# Patient Record
Sex: FEMALE | Race: OTHER | Employment: FULL TIME | URBAN - METROPOLITAN AREA
[De-identification: names, ages, dates, MRNs, and addresses within clinical notes are randomized per-mention and may not be internally consistent; named-entity substitution may affect disease eponyms.]

---

## 2018-10-19 ENCOUNTER — HOSPITAL ENCOUNTER (EMERGENCY)
Age: 28
Discharge: HOME | End: 2018-10-19
Attending: EMERGENCY MEDICINE
Payer: COMMERCIAL

## 2018-10-19 VITALS
SYSTOLIC BLOOD PRESSURE: 132 MMHG | HEIGHT: 61 IN | RESPIRATION RATE: 18 BRPM | OXYGEN SATURATION: 99 % | BODY MASS INDEX: 33.42 KG/M2 | TEMPERATURE: 98.2 F | DIASTOLIC BLOOD PRESSURE: 82 MMHG | WEIGHT: 177 LBS | HEART RATE: 90 BPM

## 2018-10-19 DIAGNOSIS — L05.01 PILONIDAL ABSCESS: Primary | ICD-10-CM

## 2018-10-19 PROCEDURE — 75810000289 HC I&D ABSCESS SIMP/COMP/MULT

## 2018-10-19 PROCEDURE — 74011000250 HC RX REV CODE- 250: Performed by: PHYSICIAN ASSISTANT

## 2018-10-19 PROCEDURE — 99282 EMERGENCY DEPT VISIT SF MDM: CPT

## 2018-10-19 RX ORDER — LIDOCAINE HYDROCHLORIDE AND EPINEPHRINE 20; 10 MG/ML; UG/ML
10 INJECTION, SOLUTION INFILTRATION; PERINEURAL
Status: COMPLETED | OUTPATIENT
Start: 2018-10-19 | End: 2018-10-19

## 2018-10-19 RX ADMIN — LIDOCAINE HYDROCHLORIDE,EPINEPHRINE BITARTRATE 200 MG: 20; .01 INJECTION, SOLUTION INFILTRATION; PERINEURAL at 23:08

## 2018-10-20 NOTE — DISCHARGE INSTRUCTIONS
Absceso pilonidal: Instrucciones de cuidado - [ Pilonidal Abscess: Care Instructions ]  Instrucciones de cuidado    Un absceso pilonidal es margarette infección provocada por un pelo encarnado. Shauna absceso se presenta en la raina del cóccix (base de la columna) y en la parte superior de las nalgas. La infección hace que se forme un saco de pus. Eso puede ser bastante doloroso. Es posible que el médico haya abierto y Amilcar Reji. Puede cuidarse en foley hogar para ayudar a sanar la raina. En algunos casos, el absceso vuelve a aparecer. Si es así, el médico podría sugerirle que se opere para eliminar el sitio de la infección. Es posible que le hayan dado un sedante para ayudarle a relajarse. Usted podría estar algo tambaleante después de la sedación. Pueden pasar algunas horas hasta que los efectos del medicamento desaparezcan. Los efectos secundarios comunes de la sedación incluyen náuseas, vómitos y sensación de somnolencia o cansancio. El médico lo gordon examinado minuciosamente, do pueden presentarse problemas más tarde. Si nota algún problema o nuevos síntomas, busque tratamiento médico de inmediato. La atención de seguimiento es margarette parte clave de foley tratamiento y seguridad. Asegúrese de hacer y acudir a todas las citas, y llame a foley médico si está teniendo problemas. También es margarette buena idea saber los resultados de nerissa exámenes y mantener margarette lista de los medicamentos que wilder. ¿Cómo puede cuidarse en el hogar? · Si el médico le tre un sedante:  ? Diaz 24 horas, no frank nada que requiera prestar atención a detalles. Hace falta tiempo hasta que los efectos del medicamento desaparezcan por completo. ? Para foley seguridad, no conduzca ni opere maquinaria que pudiera ser Benna Pack. Espere hasta que los efectos del medicamento desaparezcan y usted pueda pensar con claridad y reaccionar con facilidad. · Si foley médico le recetó antibióticos, tómelos exactamente según las indicaciones.  No deje de tomarlos solo porque se sienta mejor. Debe romina todos los antibióticos hasta terminarlos. · Sea nora con los medicamentos. Rienzi los analgésicos (medicamentos para el dolor) exactamente según lo indicado. ? Si el [employer] tre un analgésico recetado, tómelo según las indicaciones. ? Si no está tomando un analgésico recetado, pregúntele a foley médico si puede romina andrea de [employer]. · Si el médico abrió y drenó el absceso, usted podría tener gasa u otro material dentro de la herida. Siga todas las instrucciones del médico sobre cómo cuidar la herida. · Mantenga la raina de la herida muy limpia. Use bolitas de algodón húmedo, un paño tibio o toallitas para bebé. Limpie la raina suavemente, especialmente después de evacuar el intestino. ¿Cuándo debe pedir ayuda? Llame al 911 en cualquier momento que considere que necesita atención de [place]. Por ejemplo, llame si:    · Tiene dificultad para respirar.     · Se desmayó (perdió el conocimiento).    Llame a foley médico ahora mismo o busque atención médica inmediata si:    · Tiene náuseas o vómito nuevos o peores.     · Tiene síntomas de infección, tales ivet:  ? Aumento del dolor, la hinchazón, la temperatura o el enrojecimiento. ? Vetas rojizas que salen de la raina. ? Pus que sale de la raina. ? Arnulfo Girt especial atención a los cambios en foley naman y asegúrese de comunicarse con foley médico si:    · No mejora ivet se esperaba. ¿Dónde puede encontrar más información en inglés? Everton Sang a http://gary-ivonne.info/. Rosy Chu T041 en la búsqueda para aprender más acerca de \"Absceso pilonidal: Instrucciones de cuidado - [ Pilonidal Abscess: Care Instructions ]. \"  Revisado: 18 abrahan, 2018  Versión del contenido: 11.8  © 4478-4390 [employer], [employer]. Las instrucciones de cuidado fueron adaptadas bajo licencia por [employer] (which disclaims liability or warranty for this information).  Si usted tiene preguntas sobre margarette afección Siomara o sobre estas instrucciones, siempre pregunte a foley profesional de naman. [facility] niega toda garantía o responsabilidad por foley uso de esta información.

## 2018-10-20 NOTE — ED TRIAGE NOTES
Triage: Patient arrives ambulatory from home with c/o pain and abscess to buttocks.  Reports hx of same requiring I & D.

## 2018-10-20 NOTE — ED PROVIDER NOTES
This is a 32 yo  female with medical hx remarkable for pilonidal abscess presenting with complaint of a 4 day hx of developing abscess to the gluteal cleft, progressively worsening with associated pain, exacerbated with sitting, walking and standing. Seen at [facility] yesterday and prescribed Tylenol #3, bactrim and augmentin. Has taken one day of medications. No drainage, fever, chills, headache, chest pain, SOB, nausea, vomiting, diarrhea or urinary complaint. The history is provided by the patient. Skin Problem Past Medical History:  
Diagnosis Date  Syncope and collapse 8/4/2016 History reviewed. No pertinent surgical history. History reviewed. No pertinent family history. Social History Socioeconomic History  Marital status:  Spouse name: Not on file  Number of children: Not on file  Years of education: Not on file  Highest education level: Not on file Social Needs  Financial resource strain: Not on file  Food insecurity - worry: Not on file  Food insecurity - inability: Not on file  Transportation needs - medical: Not on file  Transportation needs - non-medical: Not on file Occupational History  Not on file Tobacco Use  Smoking status: Never Smoker Substance and Sexual Activity  Alcohol use: No  
  Alcohol/week: 0.0 oz  Drug use: No  
 Sexual activity: Not on file Other Topics Concern  Not on file Social History Narrative  Not on file ALLERGIES: Patient has no known allergies. Review of Systems Constitutional: Negative. Negative for chills, fatigue and fever. HENT: Negative. Negative for congestion, ear pain, facial swelling, rhinorrhea, sneezing and sore throat. Respiratory: Negative for cough, chest tightness and shortness of breath. Cardiovascular: Negative. Negative for chest pain. Gastrointestinal: Negative.   Negative for abdominal pain, constipation, diarrhea, nausea and vomiting. Genitourinary: Negative for difficulty urinating, frequency and urgency. Skin: Positive for color change and wound. Negative for rash. Neurological: Negative for headaches. All other systems reviewed and are negative. Vitals:  
 10/19/18 2233 BP: 132/82 Pulse: 90 Resp: 18 Temp: 98.2 °F (36.8 °C) SpO2: 99% Weight: 80.3 kg (177 lb) Height: 5' 1\" (1.549 m) Physical Exam  
Constitutional: She is oriented to person, place, and time. She appears well-developed and well-nourished. No distress. Well appearing  female in NAD HENT:  
Head: Normocephalic and atraumatic. Right Ear: External ear normal.  
Left Ear: External ear normal.  
Nose: Nose normal.  
Mouth/Throat: Oropharynx is clear and moist. No oropharyngeal exudate. Eyes: Conjunctivae and EOM are normal. Pupils are equal, round, and reactive to light. Right eye exhibits no discharge. Left eye exhibits no discharge. No scleral icterus. Neck: Normal range of motion. Neck supple. Cardiovascular: Regular rhythm. Exam reveals no gallop and no friction rub. No murmur heard. Pulmonary/Chest: Effort normal and breath sounds normal. She has no wheezes. She has no rales. Abdominal: Soft. Bowel sounds are normal. She exhibits no distension. There is no tenderness. There is no rebound and no guarding. Neurological: She is alert and oriented to person, place, and time. No cranial nerve deficit. Coordination normal.  
Skin: Skin is warm and dry. She is not diaphoretic. Psychiatric: She has a normal mood and affect. Her behavior is normal.  
Nursing note and vitals reviewed. MDM Number of Diagnoses or Management Options Pilonidal abscess:  
Diagnosis management comments: 34 yo  female with pilonidal abscess. Plan I and D. April Arvin Padilla 
 
 
  
 
I&D Abcess Complex Date/Time: 10/19/2018 11:05 PM 
Performed by: Arvin Renteria 
 Authorized by: Nic Casas, [address] Consent:  
  Consent obtained:  Verbal 
  Consent given by:  Patient Risks discussed:  Bleeding, incomplete drainage, pain, infection and damage to other organs Alternatives discussed:  No treatment, delayed treatment, alternative treatment, observation and referral 
Location:  
  Type:  Abscess Location: gluteal cleft. Anesthesia (see MAR for exact dosages): Anesthesia method:  Local infiltration Procedure type:  
  Complexity:  Complex Procedure details:  
  Needle aspiration: no Incision types:  Stab incision Incision depth:  Subcutaneous Scalpel blade:  11 Wound management:  Probed and deloculated Drainage:  Purulent Drainage amount:  Copious Wound treatment:  Wound left open Packing materials:  1/4 in gauze Post-procedure details:  
  Patient tolerance of procedure: Tolerated well, no immediate complications Progress note Patient's results have been reviewed with them. Patient and/or family have verbally conveyed their understanding and agreement of the patient's signs, symptoms, diagnosis, treatment and prognosis and additionally agree to follow up as recommended or return to the Emergency Room should their condition change prior to follow-up. Discharge instructions have also been provided to the patient with some educational information regarding their diagnosis as well a list of reasons why they would want to return to the ER prior to their follow-up appointment should their condition change. April C [facility], [address] 
 
A/P Abscess: APPLY WARM COMPRESS. REMOVAL OF PACKING IN 48 HOURS. FOLLOW UP IF ANY INCREASE IN REDNESS OR STREAKING OF REDNESS AS DISCUSSED. FINISH ANTIBIOTICS AS PRESCRIBED.

## 2021-09-04 ENCOUNTER — HOSPITAL ENCOUNTER (INPATIENT)
Age: 31
LOS: 3 days | Discharge: HOME | DRG: 177 | End: 2021-09-07
Attending: EMERGENCY MEDICINE | Admitting: INTERNAL MEDICINE

## 2021-09-04 ENCOUNTER — APPOINTMENT (OUTPATIENT)
Dept: GENERAL RADIOLOGY | Age: 31
DRG: 177 | End: 2021-09-04
Attending: EMERGENCY MEDICINE

## 2021-09-04 DIAGNOSIS — R09.02 HYPOXEMIA: Primary | ICD-10-CM

## 2021-09-04 DIAGNOSIS — U07.1 PNEUMONIA DUE TO COVID-19 VIRUS: ICD-10-CM

## 2021-09-04 DIAGNOSIS — J12.82 PNEUMONIA DUE TO COVID-19 VIRUS: ICD-10-CM

## 2021-09-04 LAB
ALBUMIN SERPL-MCNC: 3.3 G/DL (ref 3.5–5)
ALBUMIN/GLOB SERPL: 0.7 {RATIO} (ref 1.1–2.2)
ALP SERPL-CCNC: 60 U/L (ref 45–117)
ALT SERPL-CCNC: 44 U/L (ref 12–78)
ANION GAP SERPL CALC-SCNC: 7 MMOL/L (ref 5–15)
AST SERPL-CCNC: 51 U/L (ref 15–37)
BASOPHILS # BLD: 0 K/UL (ref 0–0.1)
BASOPHILS NFR BLD: 0 % (ref 0–1)
BILIRUB SERPL-MCNC: 0.3 MG/DL (ref 0.2–1)
BUN SERPL-MCNC: 11 MG/DL (ref 6–20)
BUN/CREAT SERPL: 13 (ref 12–20)
CALCIUM SERPL-MCNC: 8.5 MG/DL (ref 8.5–10.1)
CHLORIDE SERPL-SCNC: 108 MMOL/L (ref 97–108)
CO2 SERPL-SCNC: 25 MMOL/L (ref 21–32)
COMMENT, HOLDF: NORMAL
COVID-19 RAPID TEST, COVR: DETECTED
CREAT SERPL-MCNC: 0.86 MG/DL (ref 0.55–1.02)
DIFFERENTIAL METHOD BLD: ABNORMAL
EOSINOPHIL # BLD: 0 K/UL (ref 0–0.4)
EOSINOPHIL NFR BLD: 0 % (ref 0–7)
ERYTHROCYTE [DISTWIDTH] IN BLOOD BY AUTOMATED COUNT: 14.6 % (ref 11.5–14.5)
GLOBULIN SER CALC-MCNC: 4.8 G/DL (ref 2–4)
GLUCOSE SERPL-MCNC: 98 MG/DL (ref 65–100)
HCT VFR BLD AUTO: 37.9 % (ref 35–47)
HGB BLD-MCNC: 12.1 G/DL (ref 11.5–16)
IMM GRANULOCYTES # BLD AUTO: 0 K/UL (ref 0–0.04)
IMM GRANULOCYTES NFR BLD AUTO: 0 % (ref 0–0.5)
LYMPHOCYTES # BLD: 0.8 K/UL (ref 0.8–3.5)
LYMPHOCYTES NFR BLD: 18 % (ref 12–49)
MCH RBC QN AUTO: 25.7 PG (ref 26–34)
MCHC RBC AUTO-ENTMCNC: 31.9 G/DL (ref 30–36.5)
MCV RBC AUTO: 80.5 FL (ref 80–99)
MONOCYTES # BLD: 0.2 K/UL (ref 0–1)
MONOCYTES NFR BLD: 5 % (ref 5–13)
NEUTS SEG # BLD: 3.5 K/UL (ref 1.8–8)
NEUTS SEG NFR BLD: 77 % (ref 32–75)
NRBC # BLD: 0 K/UL (ref 0–0.01)
NRBC BLD-RTO: 0 PER 100 WBC
PLATELET # BLD AUTO: 208 K/UL (ref 150–400)
PMV BLD AUTO: 10.2 FL (ref 8.9–12.9)
POTASSIUM SERPL-SCNC: 3.9 MMOL/L (ref 3.5–5.1)
PROT SERPL-MCNC: 8.1 G/DL (ref 6.4–8.2)
RBC # BLD AUTO: 4.71 M/UL (ref 3.8–5.2)
RBC MORPH BLD: ABNORMAL
SAMPLES BEING HELD,HOLD: NORMAL
SODIUM SERPL-SCNC: 140 MMOL/L (ref 136–145)
SOURCE, COVRS: ABNORMAL
TROPONIN I SERPL-MCNC: <0.05 NG/ML
WBC # BLD AUTO: 4.5 K/UL (ref 3.6–11)

## 2021-09-04 PROCEDURE — 74011250637 HC RX REV CODE- 250/637: Performed by: EMERGENCY MEDICINE

## 2021-09-04 PROCEDURE — 36415 COLL VENOUS BLD VENIPUNCTURE: CPT

## 2021-09-04 PROCEDURE — 85025 COMPLETE CBC W/AUTO DIFF WBC: CPT

## 2021-09-04 PROCEDURE — 80053 COMPREHEN METABOLIC PANEL: CPT

## 2021-09-04 PROCEDURE — 87635 SARS-COV-2 COVID-19 AMP PRB: CPT

## 2021-09-04 PROCEDURE — 74011250636 HC RX REV CODE- 250/636: Performed by: EMERGENCY MEDICINE

## 2021-09-04 PROCEDURE — 99285 EMERGENCY DEPT VISIT HI MDM: CPT

## 2021-09-04 PROCEDURE — 93005 ELECTROCARDIOGRAM TRACING: CPT

## 2021-09-04 PROCEDURE — 84484 ASSAY OF TROPONIN QUANT: CPT

## 2021-09-04 PROCEDURE — 65270000029 HC RM PRIVATE

## 2021-09-04 PROCEDURE — 94762 N-INVAS EAR/PLS OXIMTRY CONT: CPT

## 2021-09-04 PROCEDURE — 71045 X-RAY EXAM CHEST 1 VIEW: CPT

## 2021-09-04 RX ORDER — ENOXAPARIN SODIUM 100 MG/ML
30 INJECTION SUBCUTANEOUS EVERY 12 HOURS
Status: DISCONTINUED | OUTPATIENT
Start: 2021-09-04 | End: 2021-09-04 | Stop reason: DRUGHIGH

## 2021-09-04 RX ORDER — POLYETHYLENE GLYCOL 3350 17 G/17G
17 POWDER, FOR SOLUTION ORAL DAILY PRN
Status: DISCONTINUED | OUTPATIENT
Start: 2021-09-04 | End: 2021-09-07 | Stop reason: HOSPADM

## 2021-09-04 RX ORDER — ONDANSETRON 2 MG/ML
4 INJECTION INTRAMUSCULAR; INTRAVENOUS
Status: DISCONTINUED | OUTPATIENT
Start: 2021-09-04 | End: 2021-09-07 | Stop reason: HOSPADM

## 2021-09-04 RX ORDER — ACETAMINOPHEN 500 MG
1000 TABLET ORAL ONCE
Status: COMPLETED | OUTPATIENT
Start: 2021-09-04 | End: 2021-09-04

## 2021-09-04 RX ORDER — SODIUM CHLORIDE 0.9 % (FLUSH) 0.9 %
5-40 SYRINGE (ML) INJECTION EVERY 8 HOURS
Status: DISCONTINUED | OUTPATIENT
Start: 2021-09-04 | End: 2021-09-07 | Stop reason: HOSPADM

## 2021-09-04 RX ORDER — ACETAMINOPHEN 650 MG/1
650 SUPPOSITORY RECTAL
Status: DISCONTINUED | OUTPATIENT
Start: 2021-09-04 | End: 2021-09-07 | Stop reason: HOSPADM

## 2021-09-04 RX ORDER — SODIUM CHLORIDE 0.9 % (FLUSH) 0.9 %
5-40 SYRINGE (ML) INJECTION AS NEEDED
Status: DISCONTINUED | OUTPATIENT
Start: 2021-09-04 | End: 2021-09-07 | Stop reason: HOSPADM

## 2021-09-04 RX ORDER — ENOXAPARIN SODIUM 100 MG/ML
40 INJECTION SUBCUTANEOUS EVERY 12 HOURS
Status: DISCONTINUED | OUTPATIENT
Start: 2021-09-04 | End: 2021-09-07 | Stop reason: HOSPADM

## 2021-09-04 RX ORDER — PROMETHAZINE HYDROCHLORIDE 25 MG/1
12.5 TABLET ORAL
Status: DISCONTINUED | OUTPATIENT
Start: 2021-09-04 | End: 2021-09-07 | Stop reason: HOSPADM

## 2021-09-04 RX ORDER — GUAIFENESIN/DEXTROMETHORPHAN 100-10MG/5
5 SYRUP ORAL
Status: DISCONTINUED | OUTPATIENT
Start: 2021-09-04 | End: 2021-09-07 | Stop reason: HOSPADM

## 2021-09-04 RX ORDER — ACETAMINOPHEN 325 MG/1
650 TABLET ORAL
Status: DISCONTINUED | OUTPATIENT
Start: 2021-09-04 | End: 2021-09-07 | Stop reason: HOSPADM

## 2021-09-04 RX ORDER — DEXAMETHASONE 6 MG/1
6 TABLET ORAL DAILY
Status: DISCONTINUED | OUTPATIENT
Start: 2021-09-04 | End: 2021-09-07 | Stop reason: HOSPADM

## 2021-09-04 RX ADMIN — ACETAMINOPHEN 1000 MG: 500 TABLET ORAL at 21:48

## 2021-09-04 RX ADMIN — SODIUM CHLORIDE 1000 ML: 9 INJECTION, SOLUTION INTRAVENOUS at 22:04

## 2021-09-04 RX ADMIN — Medication 10 ML: at 23:01

## 2021-09-05 PROBLEM — R06.00 DYSPNEA: Status: ACTIVE | Noted: 2021-09-05

## 2021-09-05 PROBLEM — R09.02 HYPOXIA: Status: ACTIVE | Noted: 2021-09-05

## 2021-09-05 PROBLEM — J96.01 ACUTE RESPIRATORY FAILURE WITH HYPOXIA (HCC): Status: ACTIVE | Noted: 2021-09-05

## 2021-09-05 LAB
ATRIAL RATE: 88 BPM
CALCULATED P AXIS, ECG09: 38 DEGREES
CALCULATED R AXIS, ECG10: 40 DEGREES
CALCULATED T AXIS, ECG11: 14 DEGREES
COMMENT, HOLDF: NORMAL
COMMENT, HOLDF: NORMAL
CRP SERPL-MCNC: 7.55 MG/DL (ref 0–0.6)
CRP SERPL-MCNC: 7.77 MG/DL (ref 0–0.6)
D DIMER PPP FEU-MCNC: 0.82 MG/L FEU (ref 0–0.65)
D DIMER PPP FEU-MCNC: 1.07 MG/L FEU (ref 0–0.65)
DIAGNOSIS, 93000: NORMAL
FERRITIN SERPL-MCNC: 54 NG/ML (ref 8–252)
LACTATE SERPL-SCNC: 0.8 MMOL/L (ref 0.4–2)
LDH SERPL L TO P-CCNC: 317 U/L (ref 81–246)
P-R INTERVAL, ECG05: 170 MS
PROCALCITONIN SERPL-MCNC: <0.05 NG/ML
Q-T INTERVAL, ECG07: 326 MS
QRS DURATION, ECG06: 84 MS
QTC CALCULATION (BEZET), ECG08: 394 MS
SAMPLES BEING HELD,HOLD: NORMAL
SAMPLES BEING HELD,HOLD: NORMAL
VENTRICULAR RATE, ECG03: 88 BPM

## 2021-09-05 PROCEDURE — 74011250637 HC RX REV CODE- 250/637: Performed by: INTERNAL MEDICINE

## 2021-09-05 PROCEDURE — 82728 ASSAY OF FERRITIN: CPT

## 2021-09-05 PROCEDURE — 65270000029 HC RM PRIVATE

## 2021-09-05 PROCEDURE — 85379 FIBRIN DEGRADATION QUANT: CPT

## 2021-09-05 PROCEDURE — 83615 LACTATE (LD) (LDH) ENZYME: CPT

## 2021-09-05 PROCEDURE — 36415 COLL VENOUS BLD VENIPUNCTURE: CPT

## 2021-09-05 PROCEDURE — 84145 PROCALCITONIN (PCT): CPT

## 2021-09-05 PROCEDURE — 86140 C-REACTIVE PROTEIN: CPT

## 2021-09-05 PROCEDURE — 74011250636 HC RX REV CODE- 250/636: Performed by: INTERNAL MEDICINE

## 2021-09-05 PROCEDURE — 83605 ASSAY OF LACTIC ACID: CPT

## 2021-09-05 RX ORDER — GUAIFENESIN 600 MG/1
600 TABLET, EXTENDED RELEASE ORAL EVERY 12 HOURS
Status: DISCONTINUED | OUTPATIENT
Start: 2021-09-05 | End: 2021-09-07 | Stop reason: HOSPADM

## 2021-09-05 RX ADMIN — DEXAMETHASONE 6 MG: 6 TABLET ORAL at 00:11

## 2021-09-05 RX ADMIN — GUAIFENESIN 600 MG: 600 TABLET ORAL at 20:26

## 2021-09-05 RX ADMIN — ACETAMINOPHEN 650 MG: 325 TABLET ORAL at 20:26

## 2021-09-05 RX ADMIN — Medication 10 ML: at 16:21

## 2021-09-05 RX ADMIN — Medication 10 ML: at 23:14

## 2021-09-05 RX ADMIN — GUAIFENESIN 600 MG: 600 TABLET ORAL at 08:37

## 2021-09-05 RX ADMIN — Medication 10 ML: at 06:05

## 2021-09-05 RX ADMIN — ACETAMINOPHEN 650 MG: 325 TABLET ORAL at 11:58

## 2021-09-05 RX ADMIN — GUAIFENESIN AND DEXTROMETHORPHAN 5 ML: 100; 10 SYRUP ORAL at 00:11

## 2021-09-05 NOTE — PROGRESS NOTES
[facility]    Medical Progress Note      NAME: Daisy Rajan   :  1990  MRM:  [id number]    Date/Time of service 2021  7:15 AM          Assessment and Plan:     Pneumonia due to COVID-19 virus - POA, mild, about 14 days into symptoms. Procalcitonin negative, DDimer about 1, CRP about 7. Decadron PO. Out of window for remdesivir. Consider Actemra if worsening. Acute respiratory failure with hypoxia - POA due to covid. Mild, currently on 2L. If stable on 2L could DC home. Obese - Advise weight loss       Subjective:     Chief Complaint:  Cough and dypsnea    ROS:  (bold if positive, if negative)    CoughSOB/DOETolerating PT  Tolerating Diet        Objective:     Last 24hrs VS reviewed since prior progress note.  Most recent are:    Visit Vitals  /71   Pulse 90   Temp 98.3 °F (36.8 °C)   Resp 21   Ht 5' 1\" (1.549 m)   Wt 80.3 kg (177 lb)   SpO2 95%   BMI 33.44 kg/m²     SpO2 Readings from Last 6 Encounters:   21 95%   10/19/18 99%    O2 Flow Rate (L/min): 2 l/min   No intake or output data in the 24 hours ending 21 0715     Physical Exam:    Gen:  Obese, in no acute distress  HEENT:  Pink conjunctivae, PERRL, hearing intact to voice, moist mucous membranes  Neck:  Supple, without masses, thyroid non-tender  Resp:  No accessory muscle use, coarse breath sounds without wheezes rales or rhonchi  Card:  No murmurs, tachycardia S1, S2 without thrills, bruits or peripheral edema  Abd:  Soft, non-tender, non-distended, normoactive bowel sounds are present, no mass  Lymph:  No cervical or inguinal adenopathy  Musc:  No cyanosis or clubbing  Skin:  No rashes or ulcers, skin turgor is good  Neuro:  Cranial nerves are grossly intact, no focal motor weakness, follows commands appropriately  Psych:  Good insight, oriented to person, place and time, alert    Telemetry reviewed:   normal sinus rhythm  __________________________________________________________________  Medications Reviewed: (see below)  Medications:     Current Facility-Administered Medications   Medication Dose Route Frequency    sodium chloride (NS) flush 5-40 mL  5-40 mL IntraVENous Q8H    sodium chloride (NS) flush 5-40 mL  5-40 mL IntraVENous PRN    acetaminophen (TYLENOL) tablet 650 mg  650 mg Oral Q6H PRN    Or    acetaminophen (TYLENOL) suppository 650 mg  650 mg Rectal Q6H PRN    polyethylene glycol (MIRALAX) packet 17 g  17 g Oral DAILY PRN    promethazine (PHENERGAN) tablet 12.5 mg  12.5 mg Oral Q6H PRN    Or    ondansetron (ZOFRAN) injection 4 mg  4 mg IntraVENous Q6H PRN    guaiFENesin-dextromethorphan (ROBITUSSIN DM) 100-10 mg/5 mL syrup 5 mL  5 mL Oral Q4H PRN    dexAMETHasone (DECADRON) tablet 6 mg  6 mg Oral DAILY    enoxaparin (LOVENOX) injection 40 mg  40 mg SubCUTAneous Q12H     Current Outpatient Medications   Medication Sig    norgestimate-ethinyl estradiol (SPRINTEC, 28,) 0.25-35 mg-mcg tab Take 1 Tab by mouth daily. Use back up protection for the first week of using the pill. Indications: MENORRHAGIA    hydrocortisone (CORTAID) 1 % topical cream Apply  to affected area two (2) times a day. use thin layer        Lab Data Reviewed: (see below)  Lab Review:     Recent Labs     09/04/21  2208   WBC 4.5   HGB 12.1   HCT 37.9        Recent Labs     09/04/21  2208      K 3.9      CO2 25   GLU 98   BUN 11   CREA 0.86   CA 8.5   ALB 3.3*   TBILI 0.3   ALT 44     Lab Results   Component Value Date/Time    Glucose POC 79 11/21/2015 01:48 PM     No results for input(s): PH, PCO2, PO2, HCO3, FIO2 in the last 72 hours. No results for input(s): INR, INREXT in the last 72 hours.   All Micro Results     Procedure Component Value Units Date/Time    COVID-19 RAPID TEST [[id number]]  (Abnormal) Collected: 09/04/21 2208    Order Status: Completed Specimen: Nasopharyngeal Updated: 09/04/21 2227     Specimen source Nasopharyngeal        COVID-19 rapid test Detected        Comment: Rapid Abbott ID Now       The specimen is POSITIVE for SARS-CoV-2, the novel coronavirus associated with COVID-19. This test has been authorized by the FDA under an Emergency Use Authorization (EUA) for use by authorized laboratories. Fact sheet for Healthcare Providers: ConventionUpdate.co.nz  Fact sheet for Patients: ConventionElivardate.co.nz       Methodology: Isothermal Nucleic Acid Amplification  CALLED TO AND READ BACK BY  JAZLYN MESA AT 2227. MG               Other pertinent lab: none    Total time spent with patient: 30 Minutes I personally reviewed chart, notes, data and current medications in the medical record. I have personally examined and treated the patient at bedside during this period.                  Care Plan discussed with: Patient, Nursing Staff and >50% of time spent in counseling and coordination of care    Discussed:  Care Plan and D/C Planning    Prophylaxis:  H2B/PPI    Disposition:  Home w/Family           ___________________________________________________    Attending Physician: Mirella Marquez MD

## 2021-09-05 NOTE — ED NOTES
Bedside and Verbal shift change report given to Nellie Abrams RN (oncoming nurse) by Sheryle Merle, RN (offgoing nurse). Report included the following information SBAR, Kardex, ED Summary, [facility] and Recent Results.

## 2021-09-05 NOTE — ED NOTES
Report endorsed at this time. Assumed the care will make a first contact and implement the plan of care.

## 2021-09-05 NOTE — ED NOTES
TRANSFER - OUT REPORT:    Verbal report given to Zackery Lopez RN on Jesuen Katina  being transferred to 4th floor Bed #207 for routine progression of care       Report consisted of patients Situation, Background, Assessment and   Recommendations(SBAR). Information from the following report(s) SBAR, Kardex, ED Summary, Procedure Summary and Recent Results was reviewed with the receiving nurse. Lines:   Peripheral IV 09/04/21 Right Antecubital (Active)   Site Assessment Clean, dry, & intact 09/04/21 2203   Phlebitis Assessment 0 09/04/21 2203   Infiltration Assessment 0 09/04/21 2203   Dressing Status Clean, dry, & intact 09/04/21 2203   Dressing Type Transparent 09/04/21 2203   Hub Color/Line Status Pink 09/04/21 2203   Action Taken Blood drawn 09/04/21 2203        Opportunity for questions and clarification was provided.       Patient transported with:   O2 @ 2 liters  Registered Nurse

## 2021-09-05 NOTE — ED NOTES
Patient attempted to walk to bathroom but became short of breath. Refused bedside commode. Asked if she would like a female nurse to assist her on bedpan; but refused as well.

## 2021-09-05 NOTE — ED NOTES
Oxygen Saturation Assessment     10:33 PM    O2 saturation at rest on room air  87%  (If resting saturation was 88% or less this is all you need;  If greater that 88 % on RA, you need to continue the rest of the assessment)    O2 saturation on O2 at 2 LPM with exertion 86%    Joellen Virgen RN and Suzie Puente RN

## 2021-09-05 NOTE — H&P
[facility]  [address], [facility] [address] [phone]    Admission History and Physical      NAME:       Daisy Rajna   :       1990   MRN:      [id number]     PCP:      None     Date of service:   2021     Chief  Complaint:  SOB    History Of Presenting Illness:       Ms. Claude Mendoza is a 35 y.o. female who is being admitted for Pneumonia due to COVID-19 virus. Ms. Claude Mendoza presented to our Emergency Department today complaining of a progressively worsening SOB, at rest, worse with exertion and associated with a non productive cough, generalized weakness and subjective fever. She says she was diagnosed with COVID-19 about 9 days ago and her symptoms had been getting better up until thee days prior to her presentation to the ED. She denies any nausea or vomiting. In the ED, a CXR done showed low lung volumes with bibasilar atelectasis or minimal infiltrate right greater than left. Given her hypoxia, she will be admitted for further management. No Known Allergies    Prior to Admission medications    Medication Sig Start Date End Date Taking? Authorizing Provider   norgestimate-ethinyl estradiol ([facility], ,) 0.25-35 mg-mcg tab Take 1 Tab by mouth daily. Use back up protection for the first week of using the pill. Indications: MENORRHAGIA 16   Ty Valenzuela PA   hydrocortisone (CORTAID) 1 % topical cream Apply  to affected area two (2) times a day. use thin layer 11/21/15   Ty Valenzuela PA       Past Medical History:   Diagnosis Date    Syncope and collapse 2016        No past surgical history on file.     Social History     Tobacco Use    Smoking status: Never Smoker   Substance Use Topics    Alcohol use: No     Alcohol/week: 0.0 standard drinks        Family History   Problem Relation Age of Onset  Heart Disease Neg Hx         Review of Systems:    Constitutional ROS: + fever, + chills, + rigors nut no night sweats  Respiratory ROS: + cough, - sputum, - hemoptysis, + dyspnea but no pleuritic pain. Cardiovascular ROS: no palpitations, orthopnea, PND or syncope  Endocrine ROS: no polydispsia, polyuria, heat or cold intolerance or major weight change. Gastrointestinal ROS: no dysphagia, abdominal pain, nausea, vomiting or diarrhea    Genito-Urinary ROS: no dysuria, frequency, hematuria, retention or flank pain  Musculoskeletal ROS: no joint pain, swelling or muscular tenderness  Neurological ROS: no headache, confusion, focal weakness or any other neurological symptoms  Psychiatric ROS: no depression, anxiety, mood swings  Dermatological ROS: no rash, pruritis, or urticaria  Heme-Lymph ROS: no swollen glands, bleeding    Examination:    Constitutional:    Visit Vitals  BP (!) 111/58   Pulse 88   Temp 99.6 °F (37.6 °C)   Resp 26   Ht 5' 1\" (1.549 m)   Wt 80.3 kg (177 lb)   LMP 09/04/2021 (Exact Date)   SpO2 93%   BMI 33.44 kg/m²         General:  Weak and ill looking patient in no acute distress  Eyes: Pink conjunctivae, PERRLA with no discharge. Normal eye movements  Ear, Nose, Mouth & Throat: No ottorrhea, rhinorrhea, non tender sinuses, moist mucous membranes  Respiratory:  No accessory muscle use, clear breath sounds without crackles or wheezes  Cardiovascular:  No JVD or murmurs, regular and normal S1, S2 without thrills, bruits or peripheral edema. GI & :  Soft abdomen, non-tender, non-distended, normoactive bowel sounds   Heme:  No cervical or axillary adenopathy.    Musculoskeletal:  No cyanosis, clubbing, atrophy or deformities  Skin:  No rashes, bruising or ulcers   Neurological: Awake and alert, speech is clear, CNs 2-12 are grossly intact and otherwise non focal  Psychiatric:  Has a good insight and is oriented x 3  ________________________________________________________________________    Data Review:    Labs:    Recent Labs     09/04/21  2208   WBC 4.5   HGB 12.1   HCT 37.9        Recent Labs     09/04/21  2208      K 3.9      CO2 25   GLU 98   BUN 11   CREA 0.86   CA 8.5   ALB 3.3*   ALT 44     No components found for: GLPOC  No results for input(s): PH, PCO2, PO2, HCO3, FIO2 in the last 72 hours. No results for input(s): INR, INREXT, INREXT in the last 72 hours. Imaging Studies:      Chest X-ray - reviewed as noted above     Personally reviewed 12 lead EKG: Normal rate, rhythm, axis and intervals. and No acute changes suggestive of ischemia    I have also reviewed available old medical records. Assessment & Impression:     Ms. Yuval Vazquez is a 35 y.o. female being evaluated for:     Principal Problem:    Pneumonia due to COVID-19 virus (9/4/2021)    Active Problems:    Hypoxia (9/5/2021)      Dyspnea (9/5/2021)         Plan of management:    Pneumonia due to COVID-19 virus (9/4/2021) / Hypoxia (9/5/2021) / Dyspnea (9/5/2021) POA: symptoms started 9 days prior to admission. Now with severe disease. Admit to hospital. Outside the window for Remdesivir. Pro-calcitonin low. CRP 7.55. Start Dexamethasone. Oxygen supplementation. Monitor clinical progress.  Consult Pulm if she deteriorates    Code Status:  Full    Surrogate decision maker: Family    Risk of deterioration: high      Total time spent for the care of the patient: [address] discussed with: Patient, Nursing Staff and ED physician    Discussed:  Code Status, Care Plan and D/C Planning    Prophylaxis:  Lovenox    Probable Disposition:  Home w/Family           ___________________________________________________    Attending Physician: Tyrell Car MD

## 2021-09-05 NOTE — ED PROVIDER NOTES
Patient is a 81-year-old with no significant past medical history who comes into the emergency department with shortness of breath and fever in the setting of a COVID-19 infection. She was diagnosed with Covid approximately 9 days ago and reports that generally her symptoms have been improving, she has had less fevers and has not had much in the way of his legs and fatigue. However, the last 3 days she has had worsening shortness of breath. Patient is not vaccinated against Covid. The history is provided by the patient. Positive For Covid-19  Associated symptoms: no chest pain, no chills, no diarrhea, no myalgias and no vomiting    Shortness of Breath  Associated symptoms include a fever. Pertinent negatives include no chest pain and no vomiting. Past Medical History:   Diagnosis Date    Syncope and collapse 8/4/2016       No past surgical history on file. No family history on file. Social History     Socioeconomic History    Marital status:      Spouse name: Not on file    Number of children: Not on file    Years of education: Not on file    Highest education level: Not on file   Occupational History    Not on file   Tobacco Use    Smoking status: Never Smoker   Substance and Sexual Activity    Alcohol use: No     Alcohol/week: 0.0 standard drinks    Drug use: No    Sexual activity: Not on file   Other Topics Concern    Not on file   Social History Narrative    Not on file     Social Determinants of Health     Financial Resource Strain:     Difficulty of Paying Living Expenses:    Food Insecurity:     Worried About Running Out of Food in the Last Year:     [address] in the Last Year:    Transportation Needs:     Lack of Transportation (Medical):      Lack of Transportation (Non-Medical):    Physical Activity:     Days of Exercise per Week:     Minutes of Exercise per Session:    Stress:     Feeling of Stress :    Social Connections:     Frequency of Communication with Friends and Family:     Frequency of Social Gatherings with Friends and Family:     Attends Judaism Services:     Active Member of Clubs or Organizations:     Attends Club or Organization Meetings:     Marital Status:    Intimate Partner Violence:     Fear of Current or Ex-Partner:     Emotionally Abused:     Physically Abused:     Sexually Abused: ALLERGIES: Patient has no known allergies. Review of Systems   Constitutional: Positive for fever. Negative for chills and fatigue. Respiratory: Positive for shortness of breath. Cardiovascular: Negative for chest pain. Gastrointestinal: Negative for constipation, diarrhea and vomiting. Musculoskeletal: Negative for myalgias. Neurological: Negative for dizziness and light-headedness. All other systems reviewed and are negative. Vitals:    09/04/21 2012   BP: 93/62   Pulse: (!) 110   Resp: 20   Temp: (!) 101.4 °F (38.6 °C)   SpO2: (!) 88%   Weight: 80.3 kg (177 lb)   Height: 5' 1\" (1.549 m)            Physical Exam  Vitals and nursing note reviewed. Constitutional:       Appearance: She is well-developed. HENT:      Head: Normocephalic and atraumatic. Eyes:      General: No scleral icterus. Cardiovascular:      Rate and Rhythm: Normal rate. Pulmonary:      Effort: Pulmonary effort is normal.   Abdominal:      General: There is no distension. Musculoskeletal:      Cervical back: Normal range of motion. Skin:     General: Skin is warm and dry. Findings: No erythema or rash. Neurological:      General: No focal deficit present. Mental Status: She is alert and oriented to person, place, and time.    Psychiatric:         Mood and Affect: Mood normal.         Behavior: Behavior normal.          MDM  Number of Diagnoses or Management Options  Diagnosis management comments: DDX: Covid 19 pneumonia, hypoxemia, sepsis, respiratory failure, pneumonia, pneumothorax, pulmonary edema, heart failure Procedures    EKG performed at 9:50 PM  Normal sinus rhythm, 88 bpm, normal axis, normal intervals, nonspecific T wave inversions and flattening in the inferior leads, no STEMI, no ectopy  EKG interpreted by Gabi Fung MD     Oxygen Saturation Assessment   10:54 PM   O2 saturations at rest on room air  88%    O2 saturation on O2 at 2 LPM with exertion  87%  Gabi Fung MD     Patient is being admitted to the hospital.  The results of their tests and reasons for their admission have been discussed with them and/or available family. They convey agreement and understanding for the need to be admitted and for their admission diagnosis. Consultation will be made now with the inpatient physician for hospitalization. Perfect Serve Consult for Admission  10:54 PM    ED Room Number: ER10/10  Patient Name and age:  Bakari Watkins 35 y.o.  female  Working Diagnosis:   1. Hypoxemia    2. Pneumonia due to COVID-19 virus      Sepsis present:  no    Code Status:  Full Code  Readmission: no  Isolation Requirements:  yes  Recommended Level of Care:  telemetry  Department:[facility] ED - [phone]  Other:  unvaccinated      Gabi Fung MD has spent 35 minutes of critical care time involved in lab review, consultations with specialist, family decision-making, and documentation. During this entire length of time I was immediately available to the patient. Critical Care: The reason for providing this level of medical care for this critically ill patient was due a critical illness that impaired one or more vital organ systems such that there was a high probability of imminent or life threatening deterioration in the patients condition. This care involved high complexity decision making to assess, manipulate, and support vital system functions, to treat this degreee vital organ system failure and to prevent further life threatening deterioration of the patients condition.

## 2021-09-05 NOTE — PROGRESS NOTES
Enoxaparin 30 mg SQ Q12H adjusted to 40 mg SQ Q12H (COVID+, CrCl > 30 mL/min, BMI > 30, d-dimer pending) per protocol    Thank you,  Josh DURHAM [facility]

## 2021-09-05 NOTE — ED TRIAGE NOTES
Pt reports she tested positive for covid 9 days ago and for the past 3 days she has had worsened SOB. O2 sats in triage 88% on RA. Tachycardic. Not vaccinated.

## 2021-09-05 NOTE — ED NOTES
[address] [facility] [phone] [phone] Patient: Charles Flor MRN: [id number] YMV:[birth year] You are allergic to the following Allergen Reactions Sulfa (Sulfonamide Antibiotics) Unknown (comments) Recent Documentation Height Weight BMI OB Status Smoking Status 1.727 m 106.6 kg 35.73 kg/m2 Implant Current Every Day Smoker Emergency Contacts Name Discharge Info Relation Home Work Mobile Edu Alcaraz DISCHARGE CAREGIVER [3] Father [15]   [phone] Jorge Toledo  Friend [5]   [phone] About your hospitalization You were admitted on:  August 17, 2017 You last received care in the:  [facility] PACU You were discharged on:  August 17, 2017 Unit phone number:  [phone] Why you were hospitalized Your primary diagnosis was:  Not on File Providers Seen During Your Hospitalizations Provider Role Specialty Primary office phone Casper Plummer MD Attending Provider Orthopedic Surgery [phone] Your Primary Care Physician (PCP) Primary Care Physician Office Phone Office Fax NONE ** None ** ** None ** Follow-up Information Follow up With Details Comments Contact Info None   None ([phone]) Patient stated that they have no PCP Casper Plummer MD In 2 weeks For suture removal [facility] [address] [phone] [phone] Current Discharge Medication List  
  
CONTINUE these medications which have NOT CHANGED Dose & Instructions Dispensing Information Comments Morning Noon Evening Bedtime CALCIUM 600 + D 600-125 mg-unit Tab Generic drug:  calcium-cholecalciferol (d3) Your last dose was: Your next dose is: Take  by mouth daily. Refills:  0  
     
   
   
   
  
 gabapentin 100 mg capsule Commonly known as:  NEURONTIN  
   
 Patient request to attempt to ambulate to bathroom again at this time. Your last dose was: Your next dose is:    
   
   
 Dose:  100 mg Take 100 mg by mouth three (3) times daily. Refills:  0  
     
   
   
   
  
 [id number] yris-folic-dha -402 mg-mcg-mg Cmpk Your last dose was: Your next dose is: Take  by mouth. Refills:  0  
     
   
   
   
  
 VITAMIN C 500 mg tablet Generic drug:  ascorbic acid (vitamin C) Your last dose was: Your next dose is:    
   
   
 Dose:  1000 mg Take 1,000 mg by mouth daily. Refills:  0 STOP taking these medications   
 oxyCODONE-acetaminophen 5-325 mg per tablet Commonly known as:  PERCOCET Discharge Instructions [address] [place] [address] Rasheeda Garcia MD 
Postoperative Instructions Right Lower Extremity: 
 _x__Non Weight Bearing    ___Postop Shoe 
 ___Heel touch weightbearing               ___CAM Boot 
 ___Weightbearing as tolerated   _x__Splint/Cast 
 
Dressing: 
 _x__Keep Dressing or Splint clean & dry 
 _x__Do Not remove dressing; Dressing will be changed at first postoperative visit. 
 ___Other:  
 
Tacho Tracey: ? You may shower 48 hours after your surgery. Do Not allow dressing or splint to get wet! Diet:  
? Advance diet as tolerated. You may experience nausea due to anesthesia, pain or pain medications. You should avoid spicy or heavy meals initially. Pain Management: 
? If you have received a block, the pain relief can last from 4-24 hours. This also means you may not have sensation or movement in your foot for the same amount of time. ? You will have pain after the block wears off. Anticipate this and start pain medications prior to the block wearing off. 
? Do Not drive while taking narcotic pain medications. Elevation: 
? Strict elevation at or just above the level of your heart for the first 14 days after surgery.  Limit time that foot is in dependent position for trips to bathroom and kitchen as much as possible. Early control of swelling will improve future swelling. Ice:  
? _____ You may ice your surgical extremity for no longer than 20 minutes at a time, 3-4 times per day. Do Not apply ice directly to the skin. ? _____ Do Not apply ice to surgical extremity. Aspirin therapy: 325 mg aspirin daily for DVT prophylaxis ? Please DO NOT take any NSAIDs (Ibuprofen, Advil, Motrin, or Aleve) Call our office at [phone] if the following occur: ? Severe swelling, profuse bleeding or severe pain which does not improve with pain medication. ? Fever greater than 101.0; fevers less than this are very common in the first few days after surgery and are unlikely to indicate infection. Follow up: in 2 weeks Additional Instructions: You were given oxycodone 5 mg tabs for pain control. Take 1 - 2 tabs every 4 hours as needed for pain Vitamin D 4000 units daily Calcium 1200 mg daily Vitamin C 500 mg twice daily You received an IV form of Tylenol 1000mg during your surgery, you may take tylenol (or pain medication containing Tylenol or Acetaminophen) in 6 hours at 2:45pm 
 
DO NOT TAKE TYLENOL/ACETAMINOPHEN WITH PERCOCET, [address], [address] [address] [facility]. TAKE NARCOTIC PAIN MEDICATIONS WITH FOOD, and if given 2 pain narcotics do NOT take together! Narcotics tend to be constipating and we recommend taking a stool softener such as Colace or Miralax (follow package instructions). If you were given prescriptions, please review the written information on the prescribed medications. DO NOT DRIVE WHILE TAKING NARCOTIC PAIN MEDICATIONS. DISCHARGE SUMMARY from Nurse The following personal items collected during your admission are returned to you:  
Dental Appliance: Dental Appliances:  (back molar broken) Vision: Visual Aid: None Hearing Aid:   
Jewelry:   
Clothing: Clothing:  (clothing under stretcher, phone with ride) Other Valuables: Valuables sent to safe:   
 
 
PATIENT INSTRUCTIONS: 
 
After General Anesthesia or Intravenous Sedation, for 24 hours or while taking prescription Narcotics: · Someone should be with you for the next 24 hours. · For your own safety, a responsible adult must drive you home. · Limit your activities · Recommended activity: Rest today, up with assistance today. Do not climb stairs or shower unattended for the next 24 hours. · Start with a soft bland diet and advance as tolerated (no nausea) to regular diet. · If you have a sore throat some things that may help are: fluids, warm salt water gargle, or throat lozenges. If this does not improve after several days please follow up with your family physician. · Do not drive and operate hazardous machinery · Do not make important personal or business decisions · Do  not drink alcoholic beverages · If you have not urinated within 8 hours after discharge, please contact your surgeon on call. Report the following to your surgeon: 
· Excessive pain, swelling, redness or odor of or around the surgical area · Temperature over 100.5 · Nausea and vomiting lasting longer than 4 hours or if unable to take medications · Any signs of decreased circulation or nerve impairment to extremity: change in color, persistent  numbness, tingling, coldness or increase pain · If you received an upper extremity nerve block, please wear your sling until the block has worn off, then refer to your surgeons post-operative instructions. If you have had a shoulder block or a block near your collar bone, you may have              symptoms such as: 1. Mild shortness of breath 2. A hoarse voice 3. Blurry vision 4. Unequal pupils 5. Drooping of your face on the same side as the nerve block. These symptoms will disappear as the nerve block wears off.  
 
· If you received a lower extremity nerve block, please use your crutches, walker, or cane until the nerve block has worn off, then refer to your surgeons post-operative instructions. ?  
· You will receive a Post Operative Call from one of the Recovery Room Nurses on the day after your surgery to check on you. It is very important for us to know how you are recovering after your surgery. If you have an issue please call your surgeon, do not wait for the post operative call. · You may receive an e-mail or letter in the mail from [place] regarding your experience with us in the Ambulatory Surgery Unit. Your feedback is valuable to us and we appreciate your participation in the survey. ·  
· If the above instructions are not adequate, please contact Christina Alvarez RN, [facility] anesthesia Nurse Manager or our Anesthesiologist, at [phone]. If you are having problems after your surgery, call your physician at his office number. ·  
· We wish youre a speedy recovery ? What to do at Home: *  Please give a list of your current medications to your Primary Care Provider. *  Please update this list whenever your medications are discontinued, doses are 
    changed, or new medications (including over-the-counter products) are added. *  Please carry medication information at all times in case of emergency situations. These are general instructions for a healthy lifestyle: No smoking/ No tobacco products/ Avoid exposure to second hand smoke Surgeon General's Warning:  Quitting smoking now greatly reduces serious risk to your health. Obesity, smoking, and sedentary lifestyle greatly increases your risk for illness A healthy diet, regular physical exercise & weight monitoring are important for maintaining a healthy lifestyle You may be retaining fluid if you have a history of heart failure or if you experience any of the following symptoms:  Weight gain of 3 pounds or more overnight or 5 pounds in a week, increased swelling in our hands or feet or shortness of breath while lying flat in bed. Please call your doctor as soon as you notice any of these symptoms; do not wait until your next office visit. Recognize signs and symptoms of STROKE: 
 
B - Balance E-  Eyes F-  Face looks uneven A-  Arms unable to move or move even S-  Speech slurred or non-existent T-  Time-call 911 as soon as signs and symptoms begin-DO NOT go Back to bed or wait to see if you get better-TIME IS BRAIN. If you have not had your influenza or pneumococcal vaccines, please follow up with your primary care physician. The discharge information has been reviewed with the patient and caregiver. The patient and caregiver verbalized understanding. Discharge Orders None Introducing [facility]! Sabas Beltran introduces [employer] patient portal. Now you can access parts of your medical record, email your doctor's office, and request medication refills online. 1. In your internet browser, go to [email or web address] 2. Click on the First Time User? Click Here link in the Sign In box. You will see the New Member Sign Up page. 3. Enter your [employer] Access Code exactly as it appears below. You will not need to use this code after youve completed the sign-up process. If you do not sign up before the expiration date, you must request a new code. · [employer] Access Code: [email or web address] Expires: 11/3/2017 10:20 PM 
 
4. Enter the last four digits of your Social Security Number (xxxx) and Date of Birth (mm/dd/yyyy) as indicated and click Submit. You will be taken to the next sign-up page. 5. Create a [employer] ID. This will be your [employer] login ID and cannot be changed, so think of one that is secure and easy to remember. 6. Create a [employer] password. You can change your password at any time. 7. Enter your Password Reset Question and Answer. This can be used at a later time if you forget your password. 8. Enter your e-mail address. You will receive e-mail notification when new information is available in [address] 9. Click Sign Up. You can now view and download portions of your medical record. 10. Click the Download Summary menu link to download a portable copy of your medical information. If you have questions, please visit the Frequently Asked Questions section of the [facility] website. Remember, [facility] is NOT to be used for urgent needs. For medical emergencies, dial 911. Now available from your iPhone and Android! General Information Please provide this summary of care documentation to your next provider. Patient Signature:  ____________________________________________________________ Date:  ____________________________________________________________  
  
Yair Ala Provider Signature:  ____________________________________________________________ Date:  ____________________________________________________________

## 2021-09-06 LAB
CRP SERPL-MCNC: 2.83 MG/DL (ref 0–0.6)
D DIMER PPP FEU-MCNC: 1.32 MG/L FEU (ref 0–0.65)

## 2021-09-06 PROCEDURE — 86140 C-REACTIVE PROTEIN: CPT

## 2021-09-06 PROCEDURE — 65270000029 HC RM PRIVATE

## 2021-09-06 PROCEDURE — 74011250636 HC RX REV CODE- 250/636: Performed by: INTERNAL MEDICINE

## 2021-09-06 PROCEDURE — 74011250637 HC RX REV CODE- 250/637: Performed by: INTERNAL MEDICINE

## 2021-09-06 PROCEDURE — 77010033678 HC OXYGEN DAILY

## 2021-09-06 PROCEDURE — 85379 FIBRIN DEGRADATION QUANT: CPT

## 2021-09-06 PROCEDURE — 36415 COLL VENOUS BLD VENIPUNCTURE: CPT

## 2021-09-06 RX ORDER — DEXTROSE, SODIUM CHLORIDE, AND POTASSIUM CHLORIDE 5; .45; .15 G/100ML; G/100ML; G/100ML
75 INJECTION INTRAVENOUS CONTINUOUS
Status: DISCONTINUED | OUTPATIENT
Start: 2021-09-06 | End: 2021-09-07 | Stop reason: HOSPADM

## 2021-09-06 RX ADMIN — GUAIFENESIN 600 MG: 600 TABLET ORAL at 20:36

## 2021-09-06 RX ADMIN — Medication 10 ML: at 10:10

## 2021-09-06 RX ADMIN — Medication 10 ML: at 11:51

## 2021-09-06 RX ADMIN — ACETAMINOPHEN 650 MG: 325 TABLET ORAL at 10:13

## 2021-09-06 RX ADMIN — POTASSIUM CHLORIDE, DEXTROSE MONOHYDRATE AND SODIUM CHLORIDE 75 ML/HR: 150; 5; 450 INJECTION, SOLUTION INTRAVENOUS at 18:06

## 2021-09-06 RX ADMIN — DEXAMETHASONE 6 MG: 6 TABLET ORAL at 10:09

## 2021-09-06 RX ADMIN — Medication 10 ML: at 20:37

## 2021-09-06 RX ADMIN — GUAIFENESIN 600 MG: 600 TABLET ORAL at 10:09

## 2021-09-06 NOTE — PROGRESS NOTES
[facility]    Medical Progress Note      NAME: Alejandrina Chew   :  1990  MRM:  [id number]    Date/Time of service 2021  10:13 AM          Assessment and Plan:     Pneumonia due to COVID-19 virus - POA, mild, about 16 days into symptoms. Procalcitonin negative, DDimer rising slightly, CRP dropped to 3. Decadron PO. Out of window for remdesivir. Acute respiratory failure with hypoxia - POA due to covid. Slightly worse today, currently on 5L. Add guaifenesin    Obese - Advise weight loss    Hypotension - Likely physiological.  Monitor. Subjective:     Chief Complaint:  Cough and dypsnea    ROS:  (bold if positive, if negative)    CoughSOB/DOETolerating PT  Tolerating Diet        Objective:     Last 24hrs VS reviewed since prior progress note.  Most recent are:    Visit Vitals  BP 98/69 (BP 1 Location: Left upper arm, BP Patient Position: At rest)   Pulse 71   Temp 98.4 °F (36.9 °C)   Resp 18   Ht 5' 1\" (1.549 m)   Wt 80.3 kg (177 lb)   SpO2 96%   BMI 33.44 kg/m²     SpO2 Readings from Last 6 Encounters:   21 96%   10/19/18 99%    O2 Flow Rate (L/min): 5 l/min       Intake/Output Summary (Last 24 hours) at 2021 1013  Last data filed at 2021 0926  Gross per 24 hour   Intake 150 ml   Output    Net 150 ml        Physical Exam:    Gen:  Obese, in no acute distress  HEENT:  Pink conjunctivae, PERRL, hearing intact to voice, moist mucous membranes  Neck:  Supple, without masses, thyroid non-tender  Resp:  No accessory muscle use, coarse breath sounds without wheezes rales or rhonchi  Card:  No murmurs, tachycardia S1, S2 without thrills, bruits or peripheral edema  Abd:  Soft, non-tender, non-distended, normoactive bowel sounds are present, no mass  Lymph:  No cervical or inguinal adenopathy  Musc:  No cyanosis or clubbing  Skin:  No rashes or ulcers, skin turgor is good  Neuro:  Cranial nerves are grossly intact, no focal motor weakness, follows commands appropriately  Psych:  Good insight, oriented to person, place and time, alert    Telemetry reviewed:   normal sinus rhythm  __________________________________________________________________  Medications Reviewed: (see below)  Medications:     Current Facility-Administered Medications   Medication Dose Route Frequency    guaiFENesin ER (MUCINEX) tablet 600 mg  600 mg Oral Q12H    sodium chloride (NS) flush 5-40 mL  5-40 mL IntraVENous Q8H    sodium chloride (NS) flush 5-40 mL  5-40 mL IntraVENous PRN    acetaminophen (TYLENOL) tablet 650 mg  650 mg Oral Q6H PRN    Or    acetaminophen (TYLENOL) suppository 650 mg  650 mg Rectal Q6H PRN    polyethylene glycol (MIRALAX) packet 17 g  17 g Oral DAILY PRN    promethazine (PHENERGAN) tablet 12.5 mg  12.5 mg Oral Q6H PRN    Or    ondansetron (ZOFRAN) injection 4 mg  4 mg IntraVENous Q6H PRN    guaiFENesin-dextromethorphan (ROBITUSSIN DM) 100-10 mg/5 mL syrup 5 mL  5 mL Oral Q4H PRN    dexAMETHasone (DECADRON) tablet 6 mg  6 mg Oral DAILY    enoxaparin (LOVENOX) injection 40 mg  40 mg SubCUTAneous Q12H        Lab Data Reviewed: (see below)  Lab Review:     Recent Labs     09/04/21 2208   WBC 4.5   HGB 12.1   HCT 37.9        Recent Labs     09/04/21 2208      K 3.9      CO2 25   GLU 98   BUN 11   CREA 0.86   CA 8.5   ALB 3.3*   TBILI 0.3   ALT 44     Lab Results   Component Value Date/Time    Glucose POC 79 11/21/2015 01:48 PM     No results for input(s): PH, PCO2, PO2, HCO3, FIO2 in the last 72 hours. No results for input(s): INR, INREXT, INREXT in the last 72 hours.   All Micro Results     Procedure Component Value Units Date/Time    COVID-19 RAPID TEST [[id number]]  (Abnormal) Collected: 09/04/21 2208    Order Status: Completed Specimen: Nasopharyngeal Updated: 09/04/21 2227     Specimen source Nasopharyngeal        COVID-19 rapid test Detected        Comment: Rapid Abbott ID Now       The specimen is POSITIVE for SARS-CoV-2, the novel coronavirus associated with COVID-19. This test has been authorized by the FDA under an Emergency Use Authorization (EUA) for use by authorized laboratories. Fact sheet for Healthcare Providers: ConventionUpdate.co.nz  Fact sheet for Patients: ConventionUpdate.co.nz       Methodology: Isothermal Nucleic Acid Amplification  CALLED TO AND READ BACK BY  JAZLYN MESA AT 2227. MG               Other pertinent lab: none    Total time spent with patient: 30 Minutes I personally reviewed chart, notes, data and current medications in the medical record. I have personally examined and treated the patient at bedside during this period.                  Care Plan discussed with: Patient, Nursing Staff and >50% of time spent in counseling and coordination of care    Discussed:  Care Plan and D/C Planning    Prophylaxis:  H2B/PPI    Disposition:  Home w/Family           ___________________________________________________    Attending Physician: Torsten Ferguson MD

## 2021-09-07 VITALS
HEART RATE: 81 BPM | BODY MASS INDEX: 33.42 KG/M2 | HEIGHT: 61 IN | SYSTOLIC BLOOD PRESSURE: 95 MMHG | OXYGEN SATURATION: 92 % | RESPIRATION RATE: 18 BRPM | WEIGHT: 177 LBS | DIASTOLIC BLOOD PRESSURE: 66 MMHG | TEMPERATURE: 98.1 F

## 2021-09-07 PROBLEM — J96.01 ACUTE RESPIRATORY FAILURE WITH HYPOXIA (HCC): Status: RESOLVED | Noted: 2021-09-05 | Resolved: 2021-09-07

## 2021-09-07 LAB
ALBUMIN SERPL-MCNC: 3 G/DL (ref 3.5–5)
ALBUMIN/GLOB SERPL: 0.8 {RATIO} (ref 1.1–2.2)
ALP SERPL-CCNC: 57 U/L (ref 45–117)
ALT SERPL-CCNC: 68 U/L (ref 12–78)
ANION GAP SERPL CALC-SCNC: 3 MMOL/L (ref 5–15)
AST SERPL-CCNC: 52 U/L (ref 15–37)
BILIRUB SERPL-MCNC: 0.2 MG/DL (ref 0.2–1)
BUN SERPL-MCNC: 14 MG/DL (ref 6–20)
BUN/CREAT SERPL: 26 (ref 12–20)
CALCIUM SERPL-MCNC: 7.8 MG/DL (ref 8.5–10.1)
CHLORIDE SERPL-SCNC: 111 MMOL/L (ref 97–108)
CO2 SERPL-SCNC: 25 MMOL/L (ref 21–32)
CREAT SERPL-MCNC: 0.54 MG/DL (ref 0.55–1.02)
CRP SERPL-MCNC: 1.52 MG/DL (ref 0–0.6)
D DIMER PPP FEU-MCNC: 0.72 MG/L FEU (ref 0–0.65)
ERYTHROCYTE [DISTWIDTH] IN BLOOD BY AUTOMATED COUNT: 14.6 % (ref 11.5–14.5)
GLOBULIN SER CALC-MCNC: 3.9 G/DL (ref 2–4)
GLUCOSE SERPL-MCNC: 141 MG/DL (ref 65–100)
HCT VFR BLD AUTO: 34.5 % (ref 35–47)
HGB BLD-MCNC: 10.6 G/DL (ref 11.5–16)
MAGNESIUM SERPL-MCNC: 2.3 MG/DL (ref 1.6–2.4)
MCH RBC QN AUTO: 25.1 PG (ref 26–34)
MCHC RBC AUTO-ENTMCNC: 30.7 G/DL (ref 30–36.5)
MCV RBC AUTO: 81.6 FL (ref 80–99)
NRBC # BLD: 0 K/UL (ref 0–0.01)
NRBC BLD-RTO: 0 PER 100 WBC
PHOSPHATE SERPL-MCNC: 4.2 MG/DL (ref 2.6–4.7)
PLATELET # BLD AUTO: 291 K/UL (ref 150–400)
PMV BLD AUTO: 10.3 FL (ref 8.9–12.9)
POTASSIUM SERPL-SCNC: 5 MMOL/L (ref 3.5–5.1)
PROT SERPL-MCNC: 6.9 G/DL (ref 6.4–8.2)
RBC # BLD AUTO: 4.23 M/UL (ref 3.8–5.2)
SODIUM SERPL-SCNC: 139 MMOL/L (ref 136–145)
WBC # BLD AUTO: 4.3 K/UL (ref 3.6–11)

## 2021-09-07 PROCEDURE — 74011250636 HC RX REV CODE- 250/636: Performed by: INTERNAL MEDICINE

## 2021-09-07 PROCEDURE — 94618 PULMONARY STRESS TESTING: CPT

## 2021-09-07 PROCEDURE — 36415 COLL VENOUS BLD VENIPUNCTURE: CPT

## 2021-09-07 PROCEDURE — 84100 ASSAY OF PHOSPHORUS: CPT

## 2021-09-07 PROCEDURE — 86140 C-REACTIVE PROTEIN: CPT

## 2021-09-07 PROCEDURE — 85379 FIBRIN DEGRADATION QUANT: CPT

## 2021-09-07 PROCEDURE — 80053 COMPREHEN METABOLIC PANEL: CPT

## 2021-09-07 PROCEDURE — 94760 N-INVAS EAR/PLS OXIMETRY 1: CPT

## 2021-09-07 PROCEDURE — 74011250637 HC RX REV CODE- 250/637: Performed by: INTERNAL MEDICINE

## 2021-09-07 PROCEDURE — 85027 COMPLETE CBC AUTOMATED: CPT

## 2021-09-07 PROCEDURE — 83735 ASSAY OF MAGNESIUM: CPT

## 2021-09-07 PROCEDURE — 77010033678 HC OXYGEN DAILY

## 2021-09-07 RX ORDER — DEXAMETHASONE 6 MG/1
6 TABLET ORAL
Qty: 5 TABLET | Refills: 0 | Status: SHIPPED | OUTPATIENT
Start: 2021-09-07

## 2021-09-07 RX ADMIN — GUAIFENESIN 600 MG: 600 TABLET ORAL at 08:53

## 2021-09-07 RX ADMIN — DEXAMETHASONE 6 MG: 6 TABLET ORAL at 08:54

## 2021-09-07 RX ADMIN — Medication 10 ML: at 05:05

## 2021-09-07 RX ADMIN — POTASSIUM CHLORIDE, DEXTROSE MONOHYDRATE AND SODIUM CHLORIDE 75 ML/HR: 150; 5; 450 INJECTION, SOLUTION INTRAVENOUS at 07:18

## 2021-09-07 NOTE — PROGRESS NOTES
09/07/21 1207   Resting (Room Air)   SpO2 92   HR 92   During Walk (Room Air)   SpO2 90      Rate of Dyspnea 1   After Walk   SpO2 90   HR 99   Comments walked in room. returned to chair on room air.    Does the Patient Qualify for Home O2 No

## 2021-09-07 NOTE — PROGRESS NOTES
09/07/21 12:04 PM  Reason for Admission:  Pneumonia due to COVID-19, acute respiratory failure with hypoxia                 RUR Score:   12%/LOW                  Plan for utilizing home health:  None indicated at this time, patient doesn't feel like she needs HH at this time        PCP: First and Last name:  None     Name of [facility]: [facility]   Are you a current patient: Yes/No:    Approximate date of last visit:    Can you participate in a virtual visit with your PCP: No                    Current Advanced Directive/Advance Care Plan: Full Code    Patient works full time at the  center at [employer] and plans to return to work when medically cleared. Discussed health insurance coverage, as patient is listed as self pay. Patient stated that she \"enrolled, but never got a card so I don't know. \"  Patient Access contacted regarding this concern. Healthcare Decision Maker:   Primary: , Joseline Garcia, [phone]  Secondary: Mother, Loraine Vargas, [phone]                         Transition of Care Plan:                    1. Medical clearance and discharge order  2. Currently down from 5L O2 to 3L O2. Will continue to work on weaning down to room air. 3. Watch d-dimer, as it is slowly rising. 4.  to transport home at discharge. Care Management Interventions  PCP Verified by CM:  Yes ([facility])  Mode of Transport at Discharge: Self  Transition of Care Consult (CM Consult): Discharge Planning  Current Support Network: Relative's Home (Lives at home with  and family)  Confirm Follow Up Transport: Family ()  The Plan for Transition of Care is Related to the Following Treatment Goals : PNA due to COVID-19  The Patient and/or Patient Representative was Provided with a Choice of Provider and Agrees with the Discharge Plan?: Yes  Name of the Patient Representative Who was Provided with a Choice of Provider and Agrees with the Discharge Plan: Patient, Dominick Ramos [facility]  Discharge Location  Discharge Placement: Home with outpatient services  [address]

## 2021-09-07 NOTE — DISCHARGE SUMMARY
Hospitalist Discharge Summary     Patient ID:    Arden Rosenthal  [id number]  67 y.o.  1990    Admit date of service: 9/4/2021    Discharge date of service: 9/7/2021    Admission Diagnoses: Pneumonia due to COVID-19 virus [U07.1, J12.82]    Chronic Diagnoses:    Problem List as of 9/7/2021 Date Reviewed: 8/4/2016        Codes Class Noted - Resolved    Dyspnea ICD-10-CM: R06.00  ICD-9-CM: 786.09  9/5/2021 - Present        * (Principal) Pneumonia due to COVID-19 virus ICD-10-CM: U07.1, J12.82  ICD-9-CM: 480.8, 079.89  9/4/2021 - Present        Syncope and collapse ICD-10-CM: R55  ICD-9-CM: 780.2  8/4/2016 - Present        RESOLVED: Acute respiratory failure with hypoxia ([facility] ICD-10-CM: J96.01  ICD-9-CM: 518.81  9/5/2021 - 9/7/2021              Discharge Medications:   Current Discharge Medication List      START taking these medications    Details   dexAMETHasone (DECADRON) 6 mg tablet Take 1 Tablet by mouth Daily (before breakfast). Qty: 5 Tablet, Refills: 0         CONTINUE these medications which have NOT CHANGED    Details   norgestimate-ethinyl estradiol (SPRINTEC, 28,) 0.25-35 mg-mcg tab Take 1 Tab by mouth daily. Use back up protection for the first week of using the pill. Indications: MENORRHAGIA  Qty: 30 Tab, Refills: 5    Associated Diagnoses: Menorrhagia with regular cycle      hydrocortisone (CORTAID) 1 % topical cream Apply  to affected area two (2) times a day. use thin layer  Qty: 30 g, Refills: 2    Associated Diagnoses: Eczema             Follow up Care:    1. None in 1-2 weeks  2. Diet:  Regular Diet    Disposition:  Home. Advanced Directive:    Discharge Exam:  See today's note.     CONSULTATIONS: Pulmonary/Intensive care    Significant Diagnostic Studies:   Recent Labs     09/07/21  0242 09/04/21  2208   WBC 4.3 4.5   HGB 10.6* 12.1   HCT 34.5* 37.9    208     Recent Labs     09/07/21  0242 09/04/21  2208    140   K 5.0 3.9   * 108   CO2 25 25   BUN 14 11 CREA 0.54* 0.86   * 98   CA 7.8* 8.5   MG 2.3  --    PHOS 4.2  --      Recent Labs     09/07/21  0242 09/04/21  2208   ALT 68 44   AP 57 60   TBILI 0.2 0.3   TP 6.9 8.1   ALB 3.0* 3.3*   GLOB 3.9 4.8*     No results for input(s): INR, PTP, APTT, INREXT in the last 72 hours. Recent Labs     09/05/21  0014   FERR 54      No results for input(s): PH, PCO2, PO2 in the last 72 hours. No results for input(s): CPK, CKMB in the last 72 hours. No lab exists for component: TROPONINI  Lab Results   Component Value Date/Time    Glucose POC 79 11/21/2015 01:48 PM             HOSPITAL COURSE & TREATMENT RENDERED:   1. Pneumonia due to COVID-19 virus - POA, mild, about 2 weeks into symptoms. Procalcitonin negative, improving inflammatory markers. Decadron PO. Out of window for remdesivir     2. Acute respiratory failure with hypoxia - evaluated by RT before discharge and doesn't need home O2.      3. Obese - Advise weight loss        Discharged in improved condition.     Spent 35 minutes    Signed:  Gisselle Leiva MD  9/7/2021  12:41 PM

## 2021-09-07 NOTE — PROGRESS NOTES
Advance Care Planning     Advance Care Planning Clinical Specialist  Conversation Note      Date of ACP Conversation: 9/4/2021    Conversation Conducted with:   Patient with Decision Making Capacity    ACP Clinical Specialist: [address] Decision Maker:    Current Designated Health Care Decision Maker: Bhargavi ([phone])--OSD shared phone    \"Who would you like to name as your primary health care decision-maker? \"    Name: Bhargavi Perez   Relationship:  Phone number: [phone]  Teodoro Estes this person be reached easily? \" YES  \"Who would you like to name as your back-up decision maker? \"   Name: Nesha Forward  Relationship: Mother Phone number: [phone]  Teodoro Estes this person be reached easily? \" YES    Note: If the relationship of these Decision-Makers to the patient does NOT follow your state's Next of Kin hierarchy, recommend that patient complete ACP document that meets state-specific requirements to allow them to act on the patient's behalf when appropriate. Care Preferences    Hospitalization: \"If your health worsens and it becomes clear that your chance of recovery is unlikely, what would your preference be regarding hospitalization? \"    Choice:  [x]  The patient wants hospitalization  []  The patient prefers comfort-focused treatment without hospitalization. Ventilation: \"If you were in your present state of health and suddenly became very ill and were unable to breathe on your own, what would your preference be about the use of a ventilator (breathing machine) if it were available to you? \"      If patient would desire the use of a ventilator (breathing machine), answer \"yes\", if not \"no\":no    \"If your health worsens and it becomes clear that your chance of recovery is unlikely, what would your preference be about the use of a ventilator (breathing machine) if it were available to you? \"     If patient would desire the use of a ventilator (breathing machine), answer \"yes\", if not \"no\"NO      Resuscitation  \"CPR works best to restart the heart when there is a sudden event, like a heart attack, in someone who is otherwise healthy. Unfortunately, CPR does not typically restart the heart for people who have serious health conditions or who are very sick. \"    \"In the event your heart stopped as a result of an underlying serious health condition, would you want attempts to be made to restart your heart (answer \"yes\" for attempt to resuscitate) or would you prefer a natural death (answer \"no\" for do not attempt to resuscitate)? \" yes      [x] Yes  [] No   Educated Patient / Allegramichael Alpers regarding differences between Advance Directives and portable DNR orders.     Length of ACP Conversation in minutes: 10 mins      Conversation Outcomes:  [x] ACP discussion completed  [] Existing advance directive reviewed with patient; no changes to patient's previously recorded wishes   [] New Advance Directive completed   [] Portable Do Not Rescitate prepared for Provider review and signature  [] POLST/POST/MOLST/MOST prepared for Provider review and signature      Follow-up plan:    [] Schedule follow-up conversation to continue planning  [] Referred individual to Provider for additional questions/concerns   [x] Advised patient/agent/surrogate to review completed ACP document and update if needed with changes in condition, patient preferences or care setting     [] This note routed to one or more involved healthcare providers    TAMI Erwin

## 2021-09-07 NOTE — DISCHARGE INSTRUCTIONS
ACUTE DIAGNOSES:  Pneumonia due to COVID-19 virus [U07.1, J12.82]    CHRONIC MEDICAL DIAGNOSES:  Problem List as of 9/7/2021 Date Reviewed: 8/4/2016        Codes Class Noted - Resolved    Dyspnea ICD-10-CM: R06.00  ICD-9-CM: 786.09  9/5/2021 - Present        * (Principal) Pneumonia due to COVID-19 virus ICD-10-CM: U07.1, J12.82  ICD-9-CM: 480.8, 079.89  9/4/2021 - Present        Syncope and collapse ICD-10-CM: R55  ICD-9-CM: 780.2  8/4/2016 - Present        RESOLVED: Acute respiratory failure with hypoxia [facility]) ICD-10-CM: J96.01  ICD-9-CM: 518.81  9/5/2021 - 9/7/2021              DISCHARGE MEDICATIONS:          · It is important that you take the medication exactly as they are prescribed. · Keep your medication in the bottles provided by the pharmacist and keep a list of the medication names, dosages, and times to be taken in your wallet. · Do not take other medications without consulting your doctor. DIET:  Regular Diet    ACTIVITY: Activity as tolerated    ADDITIONAL INFORMATION: If you experience any of the following symptoms then please call your primary care physician or return to the emergency room if you cannot get hold of your doctor: Fever, chills, nausea, vomiting, diarrhea, change in mentation, falling, bleeding, shortness of breath. FOLLOW UP CARE:  Primary care physician. you are to call and set up an appointment to see them in 5 days. Information obtained by :  I understand that if any problems occur once I am at home I am to contact my physician. I understand and acknowledge receipt of the instructions indicated above.                                                                                                                                            Physician's or R.N.'s Signature                                                                  Date/Time Patient or Representative Signature                                                          Date/Time

## 2021-09-08 ENCOUNTER — PATIENT OUTREACH (OUTPATIENT)
Dept: CASE MANAGEMENT | Age: 31
End: 2021-09-08

## 2021-09-08 NOTE — PROGRESS NOTES
Care Transitions Initial Call    Call within 2 business days of discharge: Yes     Patient: Linnea Pac Patient : 1990 MRN: [id number]    Last Discharge [address]       Complaint Diagnosis Description Type Department Provider    21 Positive For Covid-19; Shortness of Breath Hypoxemia . .. ED to Hosp-Admission (Discharged) (ADMIT) Perri Casey MD; Cristian Noel. .. Was this an external facility discharge? No Discharge Facility: na    Challenges to be reviewed by the provider   Additional needs identified to be addressed with provider: yes  Has not picked up prednisone from pharmacy-educated on importance of          Method of communication with provider : none    Discussed COVID-19 related testing which was available at this time. Test results were positive. Patient informed of results, if available? no     Advance Care Planning:   Does patient have an Advance Directive:  not on file educated deferred to next outreach    Inpatient Readmission Risk score: Unplanned Readmit Risk Score: 12    Was this a readmission? no   Patient stated reason for the admission: SOB    Patients top risk factors for readmission: lack of knowledge about disease, medical condition-covid and medication management   Interventions to address risk factors: Obtained and reviewed discharge summary and/or continuity of care documents    Care Transition Nurse (CTN) contacted the patient by telephone to perform post hospital discharge assessment. Verified name and  with patient as identifiers. Provided introduction to self, and explanation of the CTN role. CTN reviewed discharge instructions, medical action plan and red flags with patient who verbalized understanding. Were discharge instructions available to patient? yes. Reviewed appropriate site of care based on symptoms and resources available to patient including: PCP.  Patient given an opportunity to ask questions and does not have any further questions or concerns at this time. The patient agrees to contact the PCP office for questions related to their healthcare. Medication reconciliation was performed with patient, who verbalizes understanding of administration of home medications. Advised obtaining a 90-day supply of all daily and as-needed medications. Referral to Pharm D needed: no     Home Health/Outpatient orders at discharge: none    Durable Medical Equipment ordered at discharge: None    Covid Risk Education    Educated patient about risk for severe COVID-19 due to risk factors according to CDC guidelines. CTN reviewed discharge instructions, medical action plan and red flag symptoms with the patient who verbalized understanding. Discussed COVID vaccination status: no. Education provided on COVID-19 vaccination as appropriate. Discussed exposure protocols and quarantine with CDC Guidelines. Patient was given an opportunity to verbalize any questions and concerns and agrees to contact CTN or health care provider for questions related to their healthcare. Was patient discharged with a pulse oximeter? no. Discussed and confirmed pulse oximeter discharge instructions and when to notify provider or seek emergency care. Discussed follow-up appointments. If no appointment was previously scheduled, appointment scheduling offered: patient wants to schedule own appt. Is follow up appointment scheduled within 7 days of discharge? no.   [address] follow up appointment(s): No future appointments. Non-[facility] follow up appointment(s):     Plan for follow-up call in 5-7 days based on severity of symptoms and risk factors. Plan for next call: symptom management-covid  CTN provided contact information for future needs. Goals Addressed                 This Visit's Progress     Prevent complications post hospitalization.         9/8/21 Patient reports she is feeling better, has not picked up prednisone, educated patient on importance of completing prednisone started in hospital. Patient stated she will  today or tomorrow. Denies chest pain, SOB, fever, n/v/d endorses cough. Patient will report completion of prednisone at next outreach.  ZAKIYA